# Patient Record
Sex: FEMALE | Race: OTHER
[De-identification: names, ages, dates, MRNs, and addresses within clinical notes are randomized per-mention and may not be internally consistent; named-entity substitution may affect disease eponyms.]

---

## 2018-08-13 ENCOUNTER — HOSPITAL ENCOUNTER (OUTPATIENT)
Dept: HOSPITAL 59 - ER | Age: 68
Setting detail: OBSERVATION
Discharge: HOME | End: 2018-08-13
Attending: FAMILY MEDICINE | Admitting: FAMILY MEDICINE
Payer: MEDICARE

## 2018-08-13 DIAGNOSIS — R07.9: Primary | ICD-10-CM

## 2018-08-13 DIAGNOSIS — I10: ICD-10-CM

## 2018-08-13 DIAGNOSIS — E78.00: ICD-10-CM

## 2018-08-13 DIAGNOSIS — Z85.3: ICD-10-CM

## 2018-08-13 DIAGNOSIS — E11.9: ICD-10-CM

## 2018-08-13 LAB
ALBUMIN SERPL-MCNC: 4 G/DL (ref 4–5)
ALBUMIN/GLOB SERPL: 1.2 {RATIO} (ref 1.1–1.8)
ALP SERPL-CCNC: 49 U/L (ref 35–104)
ALT SERPL-CCNC: 18 U/L (ref ?–33)
ANION GAP SERPL CALC-SCNC: 13 MMOL/L (ref 7–16)
APTT BLD: 26.4 SECONDS (ref 24.5–39.1)
AST SERPL-CCNC: 15 U/L (ref 10–35)
BASOPHILS NFR BLD: 0.4 % (ref 0–6)
BILIRUB SERPL-MCNC: 0.4 MG/DL (ref 0.2–1)
BUN SERPL-MCNC: 15 MG/DL (ref 8–23)
CO2 SERPL-SCNC: 27 MMOL/L (ref 22–29)
CREAT SERPL-MCNC: 0.5 MG/DL (ref 0.5–0.9)
EOSINOPHIL NFR BLD: 5.5 % (ref 0–6)
ERYTHROCYTE [DISTWIDTH] IN BLOOD BY AUTOMATED COUNT: 12.5 % (ref 11.5–14.5)
EST GLOMERULAR FILTRATION RATE: > 60 ML/MIN
GLOBULIN SER-MCNC: 3.4 GM/DL (ref 1.4–4.8)
GLUCOSE SERPL-MCNC: 156 MG/DL (ref 74–109)
GRANULOCYTES NFR BLD: 41 % (ref 47–80)
HCT VFR BLD CALC: 42 % (ref 35–47)
HGB BLD-MCNC: 14.4 GM/DL (ref 11.6–16)
INR PPP: 1
LYMPHOCYTES NFR BLD AUTO: 41.6 % (ref 16–45)
MCH RBC QN AUTO: 32.2 PG (ref 27–33)
MCHC RBC AUTO-ENTMCNC: 34.3 G/DL (ref 32–36)
MCV RBC AUTO: 94 FL (ref 81–97)
MONOCYTES NFR BLD: 11.5 % (ref 0–9)
PLATELET # BLD: 226 K/UL (ref 130–400)
PMV BLD AUTO: 9.5 FL (ref 7.4–10.4)
PROT SERPL-MCNC: 7.4 G/DL (ref 6.6–8.7)
PROTHROMBIN TIME: 10.7 SECONDS (ref 9.5–12.1)
RBC # BLD AUTO: 4.47 M/UL (ref 3.8–5.4)
WBC # BLD AUTO: 5.7 K/UL (ref 4.2–12.2)

## 2018-08-13 PROCEDURE — 84484 ASSAY OF TROPONIN QUANT: CPT

## 2018-08-13 PROCEDURE — 85025 COMPLETE CBC W/AUTO DIFF WBC: CPT

## 2018-08-13 PROCEDURE — 85610 PROTHROMBIN TIME: CPT

## 2018-08-13 PROCEDURE — 93010 ELECTROCARDIOGRAM REPORT: CPT

## 2018-08-13 PROCEDURE — 99285 EMERGENCY DEPT VISIT HI MDM: CPT

## 2018-08-13 PROCEDURE — 85730 THROMBOPLASTIN TIME PARTIAL: CPT

## 2018-08-13 PROCEDURE — 71045 X-RAY EXAM CHEST 1 VIEW: CPT

## 2018-08-13 PROCEDURE — 93005 ELECTROCARDIOGRAM TRACING: CPT

## 2018-08-13 PROCEDURE — 80053 COMPREHEN METABOLIC PANEL: CPT

## 2018-08-13 PROCEDURE — 99222 1ST HOSP IP/OBS MODERATE 55: CPT

## 2018-08-13 PROCEDURE — 85379 FIBRIN DEGRADATION QUANT: CPT

## 2018-08-13 PROCEDURE — 94760 N-INVAS EAR/PLS OXIMETRY 1: CPT

## 2018-08-13 PROCEDURE — 93017 CV STRESS TEST TRACING ONLY: CPT

## 2018-08-13 NOTE — DISCHARGE NOTE
VTE H&P Assessment





- Risk for VTE


Risk for VTE: No


Risk Level: Very Low


Risk Assessment Date: 08/13/18


Risk Assessment Time: 08:00


VTE Orders Placed or Will Be Placed: No


VTE Reason for No Prophylaxis: Not Indicated





Discharge Medications





- Discharge Medications


Prescriptions: 


Ibuprofen 200 mg Tablet [Motrin 200Mg] 400 mg PO Q6H PRN #30 tab


 PRN Reason: Anesthesia


Home Medications: 


 Ambulatory Orders





Cetirizine HCl 10 mg PO DAILY 01/13/15 [Last Taken 03/23/15]


Glimepiride 4 mg PO BID 01/13/15 [Last Taken 03/23/15]


Aspirin Chewable 81 mg PO QHS 03/23/15 [Last Taken 03/23/15]


Atorvastatin Calcium [Lipitor] 80 mg PO QHS 03/23/15 [Last Taken Unknown]


Hydrochlorothiazide [Hctz] 12.5 mg PO DAILY 08/13/18 [Last Taken Unknown]


Ibuprofen 200 mg Tablet [Motrin 200Mg] 400 mg PO Q6H PRN #30 tab 08/13/18 [Last 

Taken Unknown]


Insulin Degludec [Tresiba Flextouch U-100] 65 unit SC DAILY 08/13/18 [Last 

Taken Unknown]


Lisinopril 40 mg PO DAILY 08/13/18 [Last Taken Unknown]











Discharge Note





- Date


Date of Discharge Note: 08/13/18


Disposition: Home, Self-Care


Condition: (1) Good


Additional Instructions: 


follow up with Dr. Batres in one week


follow up with Dr. Nowak as scheduled


use motrin 200 mg OTC 2 pills three times a day for her chest pain





Forms:  Patient Portal Access

## 2018-08-13 NOTE — EMERGENCY DEPARTMENT RECORD
History of Present Illness





- General


Chief Complaint: Chest Pain


Stated Complaint: CHEST PAIN


Source: Patient, Family


Mode of Arrival: Ambulatory


Limitations: No limitations





- History of Present Illness


Initial Comments: 


69 yo female presents with chest pain.  The onset was about 1am.  The 

discomfort woke her from her sleep.  She initially though it was an indigestion 

like feeling.  It is also sharp.  The pain is constant.  No shortness of breath 

or sweating.  It does not hurt more to breath or move.  The pain location is 

over the left chest.  She has had some radiation in the neck.  No pain in the 

back.  She denies any history of recent similar pain.  She has a history of DM, 

elevated cholesterol, and HTN.  No history of CAD, DVT or PE.  No other recent 

changes in her health.  PCP is Dr Carbajal .





MD Complaint: Chest pain


-: Hour(s) (5)


Onset: During rest


Pain Location: Left chest


Pain Radiation: Neck


Severity: Moderate


Quality: Sharp


Consistency: Constant


Improves With: Nothing


Worsens With: Nothing


Context: Other


Anginal Symptoms: Other


Treatments Prior to Arrival: None





- Related Data


 Home Medications











 Medication  Instructions  Recorded  Confirmed  Last Taken


 


Insulin Degludec [Tresiba 1 unit SC ASDIR 08/13/18 08/13/18 Unknown





Flextouch U-100]    











 Allergies











Allergy/AdvReac Type Severity Reaction Status Date / Time


 


No Known Drug Allergies Allergy   Unverified 03/23/16 11:30














Review of Systems


Constitutional: Denies: Chills, Fever, Malaise, Weakness


Eyes: Denies: Eye discharge


ENT: Denies: Congestion, Throat pain


Respiratory: Denies: Cough, Dyspnea, Hemoptysis, Wheezes


Cardiovascular: Reports: Chest pain.  Denies: Arrhythmia, Edema, Palpitations, 

Syncope


Endocrine: Denies: Fatigue, Polydipsia, Polyuria


Gastrointestinal: Denies: Abdominal pain, Diarrhea, Nausea, Vomiting


Genitourinary: Denies: Dysuria, Urgency


Musculoskeletal: Reports: Neck pain.  Denies: Arthralgia, Back pain, Joint 

swelling, Myalgia


Skin: Denies: Change in color, Rash


Neurological: Denies: Confusion, Headache


Psychiatric: Denies: Anxiety


Hematological/Lymphatic: Denies: Easy bleeding, Easy bruising, Swollen glands





Past Medical History





- SOCIAL HISTORY


Smoking Status: Never smoker





- RESPIRATORY


Hx Respiratory Disorders: No





- CARDIOVASCULAR


Hx Cardio Disorders: Yes


Hx Hypertension: Yes


Comment:: high cholesterol





- NEURO


Hx Neuro Disorders: No





- GI


Hx GI Disorders: No





- 


Hx Genitourinary Disorders: No





- ENDOCRINE


Hx Endocrine Disorders: Yes


Hx Diabetes: Yes





- MUSCULOSKELETAL


Hx Musculoskeletal Disorders: No





- PSYCH


Hx Psych Problems: No





- HEMATOLOGY/ONCOLOGY


Hx Cancer: Yes


Hx Radiation Therapy: Yes (L breast)


Hx Blood Transfusions: Yes


Hx Blood Transfusion Reaction: No





Physical Exam





- General


General Appearance: Alert, Oriented x3, Cooperative, No acute distress


Limitations: No limitations





- Head


Head exam: Atraumatic, Normal inspection





- Eye


Eye exam: Normal appearance, PERRL.  negative: Conjunctival injection, Scleral 

icterus





- ENT


ENT exam: Normal exam, Mucous membranes moist


Ear exam: Normal external inspection


Nasal Exam: Normal inspection


Mouth exam: Normal external inspection





- Neck


Neck exam: Normal inspection, Full ROM.  negative: Tenderness





- Respiratory


Respiratory exam: Normal lung sounds bilaterally.  negative: Accessory muscle 

use, Chest wall tenderness, Decreased breath sounds, Prolonged expiratory, 

Respiratory distress, Rhonchi, Stridor, Wheezes





- Cardiovascular


Cardiovascular Exam: Regular rate, Normal rhythm, Normal heart sounds


Peripheral Pulses: 2+: Radial (R), Radial (L)





- GI/Abdominal


GI/Abdominal exam: Soft.  negative: Tenderness





- Rectal


Rectal exam: Deferred





- 


 exam: Deferred





- Extremities


Extremities exam: Normal inspection, Full ROM, Normal capillary refill.  

negative: Calf tenderness, Joint swelling, Pedal edema, Tenderness





- Back


Back exam: Reports: Normal inspection, Full ROM.  Denies: CVA tenderness (R), 

CVA tenderness (L), Muscle spasm, Rash noted, Tenderness





- Neurological


Neurological exam: Alert, Normal gait, Oriented X3





- Psychiatric


Psychiatric exam: Normal affect, Normal mood.  negative: Agitated, Anxious





- Skin


Skin exam: Dry, Intact, Normal color, Warm





Course





- Reevaluation(s)


Reevaluation #1: 


No prior EKG on the EMR.


08/13/18 06:00





08/13/18 06:03


EKG #1 0558


NSR


Rate is 76


Axis is normal


Intervals are normal


ST No acute changes, artifact noted.  Repeat EKG requests


No priors.


08/13/18 06:06


EKG  #2 0601


NSR


Rate is 77


Axis is normal


Intervals are normal


ST are normal


Prior is #1, artifact resolved.


Normal EKG


08/13/18 06:18


No acute changes on the CBC


08/13/18 06:37


The patient got relief with the nitro


The BMP was negative


08/13/18 06:37


The Troponin is negative


08/13/18 06:43


The D-Dimer is negative





08/13/18 06:53


The case was discussed with Dr Restrepo


The patient will be admitted for chest pain with serial enzymes and cardiology 

consultation





Medical Decision Making





- Lab Data


Result diagrams: 


 08/13/18 06:00





 08/13/18 06:00





Disposition


Disposition: Admit


Clinical Impression: 


 Chest pain





Disposition: Still a Patient at [facility]


Decision to Admit: Admit from ER


Decision to Admit Date: 08/13/18


Decision to Admit Time: 06:43


Condition: (1) Good


Forms:  Patient Portal Access


Time of Disposition: 06:43





Quality





- Quality Measures


Quality Measures: N/A





- Blood Pressure Screening


Does Patient Have Any of the Following: Active Dx of HTN


Blood Pressure Classification: Pre-Hypertensive BP Reading


Systolic Measurement: 135


Diastolic Measurement: 81


Screening for High Blood Pressure: Patient Exclusion, Hx of HTN []

## 2018-08-14 NOTE — RADIOLOGY REPORT
EXAM:  FRONTAL AP CHEST



HISTORY:  LEFT SIDED CHEST PAIN AND DIFFICULTY IN BREATHING FOR FIVE HOURS.  



TECHNIQUE:  A single AP portable view of the chest was obtained.  



Comparison:  AP portable chest 1/11/13.  



FINDINGS:  The heart size is within normal limits.  Calcification and mild 
torsion of the aorta.  No definite acute infiltrate is seen.  No pleural 
effusion or pneumothorax evident.  



IMPRESSION:  

1.  CALCIFICATION AND MILD TORSION OF THE AORTA.  



2.  NO ACUTE INFILTRATE IDENTIFIED.  



JOB NUMBER:  [id number]
MTDD

## 2018-08-14 NOTE — HISTORY AND PHYSICAL REPORT
DATE:   08/13/2018



CHIEF COMPLAINT: Chest pain. 



HISTORY OF PRESENT ILLNESS: This 68-year-old female presented to the emergency department at 1 a.m. 
She stated that she developed sharp chest pain that woke her up from sleep and lasted about half an 
hour. When she got to the emergency department, she had 1 nitroglycerin and 4 aspirin and was 
feeling much better. When she came to the floor, she moved her shoulders around and it caused the 
sharp pain to reproduce but that has since disappeared. When I rotated her shoulders, she felt pain 
in her left chest area. She denies being short of breath or sweaty. The pain radiates somewhat up 
into her neck but mostly is localized to the left chest and left anterior chest wall. She does have 
a history of diabetes mellitus, elevated cholesterol, hypertension. No previous history of CAD, DVT, 
or PE. Primary care physician is Dr. Hernandez. 



PAST MEDICAL HISTORY: Diabetes mellitus, hypercholesterolemia, hypertension. 



PAST SURGICAL HISTORY: Left breast lumpectomy, left knee replacement, corrective laser surgery on 
both eyes. 



MEDICATIONS: 

1. Hydrochlorothiazide 12.5 daily. 

2. Tresiba 65 units daily. 

3. Cetirizine 10 mg daily. 

4. Lipitor 80 mg at h.s. 

5. Aspirin 81 mg a day. 

6. Glimepiride 4 mg b.i.d. 

7. Lisinopril 40 mg a day. 



ALLERGIES: No known allergies. 



FAMILY/PSYCHOSOCIAL HISTORY: Unremarkable. No smoking or alcohol or drug use. 



REVIEW OF SYSTEMS: HEENT: No upper respiratory infection symptoms, cough, cold, or congestion. 
Cardiovascular: She has chest pain, anterior pain that seems to be reproducible with rotating her 
shoulders. Respiratory: Denies being short of breath, cough, cold, or congestion. Gastrointestinal: 
No nausea, vomiting, diarrhea, black stools, or bloody stools. Genitourinary: No dysuria, hematuria, 
frequency, or burning on urination. Musculoskeletal: She denies any joint or bone abnormalities. 
Neurological: No CVA, paralysis, or paresthesias. GYN: No lumps in her breasts or vaginal bleeding. 
Endocrine: She has diabetes mellitus. No hypothyroidism. Integument: No rash, ulcers, change in 
moles, or yellow skin. 



PHYSICAL EXAMINATION:

VITALS: Height 5 feet 2 inches, weight 166 pounds. Temperature 98.0, pulse 85, blood pressure 
120/62, respiratory rate 16, pulse ox 98% on room air. 

HEENT: Pupils are equal, round, and reactive to light and accommodation. Extraocular muscles are 
intact. Throat is clear. Nose is clear. Tympanic membranes are gray. 

NECK: Supple. No jugular venous distention. No hepatojugular reflux. No carotid bruits. Thyroid is 
smooth. 

CARDIOVASCULAR: Regular rate and rhythm without murmurs, clicks, rubs, or gallops. 

RESPIRATORY: Clear to auscultation and percussion. 

ABDOMEN: Soft, nontender. No hepatosplenomegaly, no masses, no tenderness. Bowel sounds are active. 

EXTREMITIES: No pitting edema. No cyanosis, no clubbing. Full range of motion. Peripheral pulses are 
good. 

BREASTS: Exam deferred. 

GYNECOLOGICAL: Exam deferred. 

RECTAL: Exam deferred. 

NEUROLOGIC: Cranial nerves II-XII intact. No gross defects. Sensation normal, strength normal. Deep 
tendon reflexes equal bilaterally with Babinski negative. 

MENTAL STATUS: Alert and oriented x3.  

MUSCULOSKELETAL: There is pain in the left chest area when I rotate her shoulders. 



IMPRESSION: 

1. Chest pain. 

2. Anterior chest wall syndrome. 

3. Diabetes mellitus. 

4. Hypertension. 

5. Hypercholesterolemia. 



PLAN: Cardiology consult with the PA who is here. They are planning to do an exercise stress test. 
[facility]

## 2018-08-15 NOTE — STRESS TEST REPORT
DATE OF TEST:   08/13/18



Ms. Turner is 68 years old, undergoing exercise stress testing for chest pain. 
Her resting heart rate is 93 beats per minute and blood pressure 178/100. 



She exercised for 5 minutes, 23 seconds without cardiac complaint. Her maximum 
heart rate was 150, which is 98% of age-predicted maximum heart rate. Peak 
blood pressure was 244/92, which is a hypertensive response. Continuous ECG 
monitoring demonstrated no ST/T changes suggestive of ischemia or arrhythmia. 



FINAL IMPRESSION: 



1.  ASYMPTOMATIC  MAXIMAL COREY STRESS COMPLETING 7 METS WITH NO ECG EVIDENCE 
FOR ISCHEMIA OR ARRHYTHMIA.

2.  HYPERTENSIVE RESPONSE TO EXERCISE WITH PEAK BLOOD PRESSURE /92.

3.  BELOW-AVERAGE PHYSICAL FITNESS FOR AGE.   



JOB NUMBER:  [id number]
[facility]

## 2019-10-21 ENCOUNTER — HOSPITAL ENCOUNTER (EMERGENCY)
Dept: HOSPITAL 59 - ER | Age: 69
Discharge: HOME | End: 2019-10-21
Payer: MEDICARE

## 2019-10-21 DIAGNOSIS — S81.851A: Primary | ICD-10-CM

## 2019-10-21 DIAGNOSIS — I10: ICD-10-CM
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
## 2019-10-21 PROCEDURE — 99283 EMERGENCY DEPT VISIT LOW MDM: CPT

## 2019-10-21 NOTE — EMERGENCY DEPARTMENT RECORD
History of Present Illness





- General


Stated Complaint: CAT BITE


Time Seen by Provider: 10/21/19 18:56


Source: Patient


Mode of Arrival: Ambulatory


Limitations: No limitations





- History of Present Illness


Initial Commments: 





pt was holding her cat when it was startled and bit her on the leg.  the cat's 

shots are up to date.


-: Minutes(s)


Extremity Location: Right: Lower leg


Place: Home


Context: Other


Associated Symptoms: None


Treatments Prior to Arrival: Bandage





- Sadie Coma Scale


Eye Response: (4) Open spontaneously


Motor Response: (6) Obeys commands


Verbal Response: (5) Oriented


[place] Total: 15





- Related Data


                                  Previous Rx's











 Medication  Instructions  Recorded


 


Ibuprofen 200 mg Tablet [Motrin 400 mg PO Q6H PRN #30 tab 08/13/18





200Mg]  


 


Amoxicillin/Potassium Clav 1 tab PO BID #14 tab 10/21/19





[Augmentin 875-125 Tablet]  











                                    Allergies











Allergy/AdvReac Type Severity Reaction Status Date / Time


 


No Known Drug Allergies Allergy   Unverified 03/23/16 11:30














Review of Systems


Reviewed: No additional complaints except as noted below


Constitutional: Reports: As per HPI.  Denies: Chills, Fever, Malaise, Night 

sweats, Weakness, Weight change


Eyes: Reports: As per HPI.  Denies: Eye discharge, Eye pain, Photophobia, Vision

change


ENT: Reports: As per HPI.  Denies: Congestion, Dental pain, Ear pain, Epistaxis,

Hearing loss, Throat pain


Respiratory: Reports: As per HPI.  Denies: Cough, Dyspnea, Hemoptysis, Stridor, 

Wheezes


Cardiovascular: Reports: As per HPI.  Denies: Arrhythmia, Chest pain, Dyspnea on

exertion, Edema, Murmurs, Orthopnea, Palpitations, Paroxysmal nocturnal dyspnea,

Rheumatic Fever, Syncope


Endocrine: Reports: As per HPI.  Denies: Fatigue, Heat or cold intolerance, 

Polydipsia, Polyuria


Gastrointestinal: Reports: As per HPI.  Denies: Abdominal pain, Constipation, 

Diarrhea, Hematemesis, Hematochezia, Melena, Nausea, Vomiting


Genitourinary: Reports: As per HPI.  Denies: Abnormal menses, Discharge, 

Dyspareunia, Dysuria, Frequency, Hematuria, Incontinence, Retention, Urgency


Musculoskeletal: Reports: As per HPI.  Denies: Arthralgia, Back pain, Gout, 

Joint swelling, Myalgia, Neck pain


Skin: Reports: As per HPI.  Denies: Bruising, Change in color, Change in 

hair/nails, Lesions, Pruritus, Rash


Neurological: Reports: As per HPI.  Denies: Abnormal gait, Confusion, Headache, 

Numbness, Paresthesias, Seizure, Tingling, Tremors, Vertigo, Weakness


Psychiatric: Reports: As per HPI.  Denies: Anxiety, Auditory hallucinations, 

Depression, Homicidal thoughts, Suicidal thoughts, Visual hallucinations


Hematological/Lymphatic: Reports: As per HPI.  Denies: Anemia, Blood Clots, Easy

bleeding, Easy bruising, Swollen glands





Past Medical History





- SOCIAL HISTORY


Smoking Status: Never smoker


Drug Use: None





- RESPIRATORY


Hx Respiratory Disorders: No





- CARDIOVASCULAR


Hx Cardio Disorders: Yes


Hx Hypertension: Yes


Comment:: high cholesterol





- NEURO


Hx Neuro Disorders: No





- GI


Hx GI Disorders: No





- 


Hx Genitourinary Disorders: No





- ENDOCRINE


Hx Endocrine Disorders: Yes


Hx Diabetes: Yes





- MUSCULOSKELETAL


Hx Musculoskeletal Disorders: No





- PSYCH


Hx Psych Problems: No





- HEMATOLOGY/ONCOLOGY


Hx Hematology/Oncology Disorders: Yes


Hx Cancer: Yes


Hx Radiation Therapy: Yes (L breast)


Hx Blood Transfusions: Yes


Hx Blood Transfusion Reaction: No





Family Medical History


Hx HTN: Mother





Physical Exam





- General


General Appearance: Alert, Oriented x3, Cooperative, Mild distress





- Head


Head exam: Normal inspection





- Eye


Eye exam: Normal appearance, PERRL, EOMI


Pupils: Normal accommodation





- ENT


ENT exam: Normal exam, Mucous membranes moist, Normal external ear exam, Normal 

orophraynx


Ear exam: Normal external inspection.  negative: External canal tenderness


Nasal Exam: Normal inspection.  negative: Discharge, Sinus tenderness


Mouth exam: Normal external inspection, Tongue normal


Teeth exam: Normal inspection.  negative: Dental caries


Throat exam: Normal inspection.  negative: Tonsillar erythema, Tonsillar exudate





- Neck


Neck exam: Normal inspection, Full ROM.  negative: Tenderness





- Respiratory


Respiratory exam: Normal lung sounds bilaterally.  negative: Respiratory 

distress





- Cardiovascular


Cardiovascular Exam: Regular rate, Normal rhythm, Normal heart sounds





- GI/Abdominal


GI/Abdominal exam: Soft, Normal bowel sounds.  negative: Tenderness





- Rectal


Rectal exam: Deferred





- 


 exam: Deferred





- Extremities


Extremities exam: Normal inspection, Full ROM, Normal capillary refill.  

negative: Tenderness





- Back


Back exam: Reports: Normal inspection, Full ROM.  Denies: Muscle spasm, Rash 

noted, Tenderness





- Neurological


Neurological exam: Alert, CN II-XII intact, Normal gait, Oriented X3





- Psychiatric


Psychiatric exam: Normal affect, Normal mood





- Skin


Skin exam: Dry, Intact, Normal color, Warm


Type of lesion: Bite/sting


Distribution of rash: RLE





Disposition


Disposition: Discharge


Clinical Impression: 


 Cat bite involving extremity





Disposition: Home, Self-Care


Condition: (1) Good


Instructions:  Animal Bite (ED)


Additional Instructions: 


recheck tomorrow.by family doctor.  return sooner if worse.


Prescriptions: 


Amoxicillin/Potassium Clav [Augmentin 875-125 Tablet] 1 tab PO BID #14 tab





Quality





- Quality Measures


Quality Measures: N/A





- Blood Pressure Screening


Does Patient Have Any of the Following: Active Dx of HTN


Systolic Measurement: ~


Screening for High Blood Pressure: Patient Exclusion, Hx of HTN []